# Patient Record
Sex: FEMALE | Race: WHITE | Employment: UNEMPLOYED | URBAN - METROPOLITAN AREA
[De-identification: names, ages, dates, MRNs, and addresses within clinical notes are randomized per-mention and may not be internally consistent; named-entity substitution may affect disease eponyms.]

---

## 2024-04-17 ENCOUNTER — OFFICE VISIT (OUTPATIENT)
Age: 39
End: 2024-04-17

## 2024-04-17 VITALS
RESPIRATION RATE: 18 BRPM | HEART RATE: 78 BPM | WEIGHT: 186.6 LBS | OXYGEN SATURATION: 98 % | SYSTOLIC BLOOD PRESSURE: 103 MMHG | DIASTOLIC BLOOD PRESSURE: 69 MMHG | BODY MASS INDEX: 31.86 KG/M2 | TEMPERATURE: 98.6 F | HEIGHT: 64 IN

## 2024-04-17 DIAGNOSIS — J30.2 SEASONAL ALLERGIES: ICD-10-CM

## 2024-04-17 DIAGNOSIS — J00 ACUTE NASOPHARYNGITIS: Primary | ICD-10-CM

## 2024-04-17 RX ORDER — FLUTICASONE PROPIONATE 50 MCG
2 SPRAY, SUSPENSION (ML) NASAL DAILY
Qty: 16 G | Refills: 0 | Status: SHIPPED | OUTPATIENT
Start: 2024-04-17

## 2024-04-17 NOTE — PATIENT INSTRUCTIONS
You have been diagnosed with an Upper respiratory viral infection. It is important to monitor your fever and take Tylenol and/or ibuprofen to keep your fever down and reduce body aches. For nasal congestion, Flonase nasal spray may be used for nasal stuffiness. To dry up nasal secretions you may use Sudafed, if you do not have high blood pressure. For people with hypertension, use Coricidin HBP. Use Mucinex (guaifenesin) to thin secretions.     It is also important to maintain good hydration, drink at least 64 ounces of fluid, water, juice, gingerale and gatorade are good choices. Avoid drinks with caffeine as they do not hydrate. Monitor for good urine output.     If fever persists, you develop abdominal pain, vomiting or shortness of breath, or do not  improve, please call PCP or go to nearest ED.     Symptoms, such as cough and nasal stuffiness usually resolve in 7 to 10 days, but may last longer.    Thank you for coming in to the [facility] today. I hope you are feeling better soon!

## 2024-04-17 NOTE — PROGRESS NOTES
Naz Celaya (:  1985) is a 38 y.o. female,New patient, here for evaluation of the following chief complaint(s):  Otalgia (Patient is having pain and pressure in right ear that started today. Patients right side of face is congested as well.)        Assessment    1. Acute nasopharyngitis  -     fluticasone (FLONASE) 50 MCG/ACT nasal spray; 2 sprays by Each Nostril route daily, Disp-16 g, R-0Normal  2. Seasonal allergies     Plan    Discussed I found no evidence of acute bacterial infection, has swelling of nasal passages.     Findings consistent with a viral illness or allergies, with symptoms for past 48 hours. There is no evidence of purulent sinus discharge, adventitious breath sounds or other findings to suggest a bacterial component. Recommendation to treat fever, with appropriate antipyretics along with adequate oral hydration, and rest were reviewed. Patient should monitor symptoms, if development of lethargy or abdominal pain present he should seek re-evaluation. Otherwise, I reviewed acute symptoms, especially fever, usually begin to resolve in 72 hours, though milder symptoms, particularly runny nose, cough, fatigue may last 7 to 10 days.      Subjective    HPI  38-year-old female presents to clinic with complaint of right otalgia.  Patient is visiting here from [place], due to move to [place] soon. Continues to eat and drink well.  She states that she is having a hard time clearing her nose, has mild sinus pressure and has watery eyes as well.  Watery eyes is primarily in the right eye.  Review of Systems    Review of Systems   Constitutional:  Negative for chills, fatigue and fever.   HENT:  Positive for ear pain, sinus pressure and sore throat. Negative for ear discharge, postnasal drip, rhinorrhea, sinus pain, tinnitus and trouble swallowing.    Respiratory:  Negative for cough and shortness of breath.    Cardiovascular:  Negative for chest pain and palpitations.   Gastrointestinal:  Positive for

## 2024-04-27 ASSESSMENT — ENCOUNTER SYMPTOMS
ABDOMINAL PAIN: 0
SINUS PRESSURE: 1
RHINORRHEA: 0
TROUBLE SWALLOWING: 0
COUGH: 0
SORE THROAT: 1
SHORTNESS OF BREATH: 0
SINUS PAIN: 0
VOMITING: 1